# Patient Record
Sex: MALE | Race: ASIAN | NOT HISPANIC OR LATINO
[De-identification: names, ages, dates, MRNs, and addresses within clinical notes are randomized per-mention and may not be internally consistent; named-entity substitution may affect disease eponyms.]

---

## 2021-11-21 PROBLEM — Z00.00 ENCOUNTER FOR PREVENTIVE HEALTH EXAMINATION: Status: ACTIVE | Noted: 2021-11-21

## 2021-11-22 ENCOUNTER — APPOINTMENT (OUTPATIENT)
Dept: ORTHOPEDIC SURGERY | Facility: CLINIC | Age: 21
End: 2021-11-22
Payer: MEDICAID

## 2021-11-22 DIAGNOSIS — M25.571 PAIN IN RIGHT ANKLE AND JOINTS OF RIGHT FOOT: ICD-10-CM

## 2021-11-22 DIAGNOSIS — S99.911A UNSPECIFIED INJURY OF RIGHT ANKLE, INITIAL ENCOUNTER: ICD-10-CM

## 2021-11-22 PROCEDURE — 99204 OFFICE O/P NEW MOD 45 MIN: CPT

## 2021-11-22 NOTE — DISCUSSION/SUMMARY
[de-identified] : Today I had a lengthy discussion with the patient and his Aides regarding their right ankle injury. I have addressed all the patient's concerns surrounding the pathology of their condition. XR imaging was completed in office today and results were reviewed with the patient. I recommend the patient undergo a course of physical therapy for the right ankle  2-3 times a week for a total of 6-8 weeks. A prescription was given for the physical therapy today. I recommended that the patient begin to transition out of the CAM boot to an supportive sneaker. The patient may continue participating in all physical activities without restrictions. He may FWB. The patient understood and verbally agreed to the treatment plan. All of their questions were answered and they were satisfied with the visit. The patient and his Aides should call the office if they have any questions or experience worsening symptoms. I would like to see the patient back in the office in PRN to reassess their condition. 				\par

## 2021-11-22 NOTE — HISTORY OF PRESENT ILLNESS
[FreeTextEntry1] : 11/22/2021: The patient is a 21 year old male presenting with 2 Aides from [facility] for an initial evaluation of right ankle injury. The patient's aide states that the patient twisted his ankle while coming out a van on 11/18/2021. He was evaluated for this issue by an outside Hospital ED, where he was not diagnosed with any acute fractures. The patient was given a CAM boot. The patient's aides do report that the patient has been weightbearing without the CAM since the initial injury. Today, the patient has minimal to no pain in office. The patient presents ambulating with a wheelchair. SYMONE denies any numbness or tingling sensations to the ankle. He has no other complaints.

## 2021-11-22 NOTE — ADDENDUM
[FreeTextEntry1] : I, Beronica Denny, acted solely as a scribe for Carmine Lang PA-C on this date 11/22/2021.\par \par All medical record entries made by the Scribe were at my, Carmine Lang PA-C, direction and personally dictated by me on 11/22/2021 . I have reviewed the chart and agree that the record accurately reflects my personal performance of the history, physical exam, assessment and plan. I have also personally directed, reviewed, and agreed with the chart.	\par

## 2022-09-13 ENCOUNTER — OFFICE (OUTPATIENT)
Dept: URBAN - METROPOLITAN AREA CLINIC 12 | Facility: CLINIC | Age: 22
Setting detail: OPHTHALMOLOGY
End: 2022-09-13
Payer: MEDICAID

## 2022-09-13 DIAGNOSIS — H16.223: ICD-10-CM

## 2022-09-13 PROCEDURE — 92002 INTRM OPH EXAM NEW PATIENT: CPT | Performed by: OPTOMETRIST

## 2022-09-13 ASSESSMENT — KERATOMETRY
OD_K2POWER_DIOPTERS: 44.25
OS_AXISANGLE_DEGREES: 078
OD_AXISANGLE_DEGREES: 097
OS_K1POWER_DIOPTERS: 42.50
OS_K2POWER_DIOPTERS: 44.00
OD_K1POWER_DIOPTERS: 43.00

## 2022-09-13 ASSESSMENT — CONFRONTATIONAL VISUAL FIELD TEST (CVF)
OS_FINDINGS: FULL
OD_FINDINGS: FULL

## 2022-09-13 ASSESSMENT — AXIALLENGTH_DERIVED
OD_AL: 23.5461
OS_AL: 23.7333

## 2022-09-13 ASSESSMENT — REFRACTION_AUTOREFRACTION
OD_AXIS: 012
OS_SPHERE: +0.50
OS_AXIS: 170
OD_SPHERE: +0.50
OS_CYLINDER: -1.25
OD_CYLINDER: -1.00

## 2022-09-13 ASSESSMENT — SPHEQUIV_DERIVED
OS_SPHEQUIV: -0.125
OD_SPHEQUIV: 0

## 2022-09-13 ASSESSMENT — DRY EYES - PHYSICIAN NOTES
OD_GENERALCOMMENTS: POOR TEAR FILM
OS_GENERALCOMMENTS: POOR TEAR FILM

## 2022-09-13 ASSESSMENT — VISUAL ACUITY
OD_BCVA: F&F
OS_BCVA: F&F

## 2023-04-25 ENCOUNTER — OFFICE (OUTPATIENT)
Dept: URBAN - METROPOLITAN AREA CLINIC 12 | Facility: CLINIC | Age: 23
Setting detail: OPHTHALMOLOGY
End: 2023-04-25
Payer: MEDICAID

## 2023-04-25 DIAGNOSIS — H16.223: ICD-10-CM

## 2023-04-25 PROCEDURE — 92012 INTRM OPH EXAM EST PATIENT: CPT | Performed by: OPTOMETRIST

## 2023-04-25 ASSESSMENT — DRY EYES - PHYSICIAN NOTES
OD_GENERALCOMMENTS: POOR TEAR FILM
OS_GENERALCOMMENTS: POOR TEAR FILM

## 2023-04-25 ASSESSMENT — REFRACTION_AUTOREFRACTION
OS_CYLINDER: -1.25
OD_AXIS: 017
OS_AXIS: 166
OS_SPHERE: +0.25
OD_SPHERE: PLANO
OD_CYLINDER: -0.50

## 2023-04-25 ASSESSMENT — KERATOMETRY
OS_K2POWER_DIOPTERS: 44.25
OD_AXISANGLE_DEGREES: 093
OS_K1POWER_DIOPTERS: 42.75
OS_AXISANGLE_DEGREES: 077
OD_K1POWER_DIOPTERS: 43.00
OD_K2POWER_DIOPTERS: 44.50

## 2023-04-25 ASSESSMENT — AXIALLENGTH_DERIVED: OS_AL: 23.7389

## 2023-04-25 ASSESSMENT — SPHEQUIV_DERIVED: OS_SPHEQUIV: -0.375

## 2023-04-25 ASSESSMENT — VISUAL ACUITY
OS_BCVA: 20/25
OD_BCVA: F&F

## 2023-04-25 ASSESSMENT — CONFRONTATIONAL VISUAL FIELD TEST (CVF)
OD_FINDINGS: FULL
OS_FINDINGS: FULL

## 2023-05-16 ENCOUNTER — OUTPATIENT (OUTPATIENT)
Dept: OUTPATIENT SERVICES | Facility: HOSPITAL | Age: 23
LOS: 1 days | End: 2023-05-16

## 2023-05-16 VITALS
RESPIRATION RATE: 16 BRPM | DIASTOLIC BLOOD PRESSURE: 76 MMHG | HEIGHT: 66 IN | TEMPERATURE: 98 F | HEART RATE: 85 BPM | WEIGHT: 195.99 LBS | OXYGEN SATURATION: 99 % | SYSTOLIC BLOOD PRESSURE: 121 MMHG

## 2023-05-16 DIAGNOSIS — K02.62 DENTAL CARIES ON SMOOTH SURFACE PENETRATING INTO DENTIN: ICD-10-CM

## 2023-05-16 DIAGNOSIS — K02.9 DENTAL CARIES, UNSPECIFIED: ICD-10-CM

## 2023-05-16 DIAGNOSIS — K05.6 PERIODONTAL DISEASE, UNSPECIFIED: ICD-10-CM

## 2023-05-16 LAB
ANION GAP SERPL CALC-SCNC: 18 MMOL/L — HIGH (ref 7–14)
BUN SERPL-MCNC: 12 MG/DL — SIGNIFICANT CHANGE UP (ref 7–23)
CALCIUM SERPL-MCNC: 9.2 MG/DL — SIGNIFICANT CHANGE UP (ref 8.4–10.5)
CHLORIDE SERPL-SCNC: 100 MMOL/L — SIGNIFICANT CHANGE UP (ref 98–107)
CO2 SERPL-SCNC: 19 MMOL/L — LOW (ref 22–31)
CREAT SERPL-MCNC: 0.62 MG/DL — SIGNIFICANT CHANGE UP (ref 0.5–1.3)
EGFR: 136 ML/MIN/1.73M2 — SIGNIFICANT CHANGE UP
GLUCOSE SERPL-MCNC: 77 MG/DL — SIGNIFICANT CHANGE UP (ref 70–99)
HCT VFR BLD CALC: 41.4 % — SIGNIFICANT CHANGE UP (ref 39–50)
HGB BLD-MCNC: 13.2 G/DL — SIGNIFICANT CHANGE UP (ref 13–17)
MCHC RBC-ENTMCNC: 26.5 PG — LOW (ref 27–34)
MCHC RBC-ENTMCNC: 31.9 GM/DL — LOW (ref 32–36)
MCV RBC AUTO: 83.1 FL — SIGNIFICANT CHANGE UP (ref 80–100)
NRBC # BLD: 0 /100 WBCS — SIGNIFICANT CHANGE UP (ref 0–0)
NRBC # FLD: 0 K/UL — SIGNIFICANT CHANGE UP (ref 0–0)
PLATELET # BLD AUTO: 270 K/UL — SIGNIFICANT CHANGE UP (ref 150–400)
POTASSIUM SERPL-MCNC: 4.4 MMOL/L — SIGNIFICANT CHANGE UP (ref 3.5–5.3)
POTASSIUM SERPL-SCNC: 4.4 MMOL/L — SIGNIFICANT CHANGE UP (ref 3.5–5.3)
RBC # BLD: 4.98 M/UL — SIGNIFICANT CHANGE UP (ref 4.2–5.8)
RBC # FLD: 13 % — SIGNIFICANT CHANGE UP (ref 10.3–14.5)
SODIUM SERPL-SCNC: 137 MMOL/L — SIGNIFICANT CHANGE UP (ref 135–145)
WBC # BLD: 7.59 K/UL — SIGNIFICANT CHANGE UP (ref 3.8–10.5)
WBC # FLD AUTO: 7.59 K/UL — SIGNIFICANT CHANGE UP (ref 3.8–10.5)

## 2023-05-16 RX ORDER — SODIUM CHLORIDE 9 MG/ML
3 INJECTION INTRAMUSCULAR; INTRAVENOUS; SUBCUTANEOUS EVERY 8 HOURS
Refills: 0 | Status: DISCONTINUED | OUTPATIENT
Start: 2023-05-22 | End: 2023-06-05

## 2023-05-16 RX ORDER — SODIUM CHLORIDE 9 MG/ML
1000 INJECTION, SOLUTION INTRAVENOUS
Refills: 0 | Status: DISCONTINUED | OUTPATIENT
Start: 2023-05-22 | End: 2023-06-05

## 2023-05-16 RX ORDER — DIAZEPAM 5 MG
1 TABLET ORAL
Refills: 0 | DISCHARGE

## 2023-05-16 NOTE — H&P PST ADULT - PROBLEM SELECTOR PLAN 1
Patient scheduled for surgery on: 5/22/23  Facility provided with verbal and written presurgical instructions  List of family member providing consent via phone listed on chart    Lab specimen drawn at PST today: cbc, bmp     Medical  evaluation requested by PST: mateo historian, will obtain

## 2023-05-16 NOTE — H&P PST ADULT - RESPIRATORY AND THORAX COMMENTS
limited information from patient aide chart does not reflect history of airway disease, facility staff did not provide active symptoms

## 2023-05-16 NOTE — H&P PST ADULT - CARDIOVASCULAR COMMENTS
limited information from patient aide chart does not reflect history of cardiac disease, facility staff did not provide active symptoms

## 2023-05-16 NOTE — H&P PST ADULT - NSANTHOSAYNRD_GEN_A_CORE
hand grasp, leg strength strong and equal bilaterally
unable to assess/No. ELIZABETH screening performed.  STOP BANG Legend: 0-2 = LOW Risk; 3-4 = INTERMEDIATE Risk; 5-8 = HIGH Risk

## 2023-05-16 NOTE — H&P PST ADULT - HEMATOLOGY/LYMPHATICS COMMENTS
no record chart does not reflect history of hematology disease, facility staff did not provide active symptoms

## 2023-05-16 NOTE — H&P PST ADULT - HISTORY OF PRESENT ILLNESS
23 yr old male with development delay, autistic disorder, mood disorder presents for PST evaluation for EUA dental exam  23 yr old male with mental development delay, Asperger's syndrome, unspecified mood disorder- patient is non-verbal presents for PST evaluation for EUA dental exam

## 2023-05-16 NOTE — H&P PST ADULT - NSICDXPASTMEDICALHX_GEN_ALL_CORE_FT
PAST MEDICAL HISTORY:  Anxiety     Autism     History of migraine     Mental developmental delay     Mood disorder     Obese      PAST MEDICAL HISTORY:  Anxiety     Autism     Dental caries     Developmental non-verbal disorder     H/O myopia     History of migraine     Mental developmental delay     Mood disorder     Obese

## 2023-05-19 NOTE — ASU PATIENT PROFILE, ADULT - FALL HARM RISK - UNIVERSAL INTERVENTIONS
Bed in lowest position, wheels locked, appropriate side rails in place/Call bell, personal items and telephone in reach/Instruct patient to call for assistance before getting out of bed or chair/Non-slip footwear when patient is out of bed/[place] to call system/Physically safe environment - no spills, clutter or unnecessary equipment/Purposeful Proactive Rounding/Room/bathroom lighting operational, light cord in reach

## 2023-05-19 NOTE — ASU PATIENT PROFILE, ADULT - FALL HARM RISK - FALL HARM RISK
Last Appt:  12/13/21  Return:  No show 4/12/22  Next Appt:  6/14/22    EPDMP check:  Lisdexamfetamine Dimesylate  40MG / Capsule  Rx# [id number] Stimulant Qty: 30  Days: 30  Refills: 0 Prescribed: 3/21/2022  Dispensed: 3/21/2022  Sold: 3/22/2022       Refill due: 4/21/22      MED:   Lisdexamfetamine Dimesylate  30MG / Capsule  Rx# [id number] Stimulant Qty: 30  Days: 30  Refills: 0 Prescribed: 3/17/2022  Dispensed: 3/19/2022  Sold: 3/19/2022         Refill due:  4/18/22           No indicators present

## 2023-05-21 ENCOUNTER — TRANSCRIPTION ENCOUNTER (OUTPATIENT)
Age: 23
End: 2023-05-21

## 2023-05-22 ENCOUNTER — OUTPATIENT (OUTPATIENT)
Dept: OUTPATIENT SERVICES | Facility: HOSPITAL | Age: 23
LOS: 1 days | Discharge: HOME | End: 2023-05-22

## 2023-05-22 ENCOUNTER — TRANSCRIPTION ENCOUNTER (OUTPATIENT)
Age: 23
End: 2023-05-22

## 2023-05-22 VITALS
DIASTOLIC BLOOD PRESSURE: 72 MMHG | HEART RATE: 80 BPM | OXYGEN SATURATION: 97 % | SYSTOLIC BLOOD PRESSURE: 119 MMHG | RESPIRATION RATE: 14 BRPM

## 2023-05-22 VITALS
HEIGHT: 66 IN | HEART RATE: 85 BPM | TEMPERATURE: 97 F | DIASTOLIC BLOOD PRESSURE: 68 MMHG | WEIGHT: 195.99 LBS | SYSTOLIC BLOOD PRESSURE: 126 MMHG | RESPIRATION RATE: 16 BRPM | OXYGEN SATURATION: 98 %

## 2023-05-22 DIAGNOSIS — K05.6 PERIODONTAL DISEASE, UNSPECIFIED: ICD-10-CM

## 2023-05-22 RX ORDER — LORATADINE 10 MG/1
1 TABLET ORAL
Refills: 0 | DISCHARGE

## 2023-05-22 RX ORDER — FLUTICASONE PROPIONATE 220 MCG
1 AEROSOL WITH ADAPTER (GRAM) INHALATION
Refills: 0 | DISCHARGE

## 2023-05-22 RX ORDER — RISPERIDONE 4 MG/1
1 TABLET ORAL
Refills: 0 | DISCHARGE

## 2023-05-22 RX ORDER — NYSTATIN CREAM 100000 [USP'U]/G
1 CREAM TOPICAL
Refills: 0 | DISCHARGE

## 2023-05-22 RX ORDER — DIVALPROEX SODIUM 500 MG/1
2 TABLET, DELAYED RELEASE ORAL
Refills: 0 | DISCHARGE

## 2023-05-22 RX ORDER — POLYETHYLENE GLYCOL 3350 17 G/17G
17 POWDER, FOR SOLUTION ORAL
Refills: 0 | DISCHARGE

## 2023-05-22 RX ORDER — SUMATRIPTAN SUCCINATE 4 MG/.5ML
1 INJECTION, SOLUTION SUBCUTANEOUS
Refills: 0 | DISCHARGE

## 2023-05-22 RX ORDER — DIAZEPAM 5 MG
0 TABLET ORAL
Refills: 0 | DISCHARGE

## 2023-05-22 RX ORDER — CLINDAMYCIN PHOSPHATE GEL USP, 1% 10 MG/G
1 GEL TOPICAL
Refills: 0 | DISCHARGE

## 2023-05-22 RX ORDER — SERTRALINE 25 MG/1
1 TABLET, FILM COATED ORAL
Refills: 0 | DISCHARGE

## 2023-05-22 NOTE — ASU DISCHARGE PLAN (ADULT/PEDIATRIC) - ASU DC SPECIAL INSTRUCTIONSFT
comprehensive dental treatment under general anesthesia to include one wisdom tooth extraction to the upper right side and upper left side, no straw for two days and keep head elevated for the next two days and nights, ice to the upper right twenty minutes on and off today and tomorrow    Tylenol prn pain and tylenol for the next two days for comfort    resume all medications     return to all routine activities  tomorrow    antibiotics will be sent to [facility] and [facility] [place] [facility] [place] 12, appt is set

## 2023-05-22 NOTE — ASU DISCHARGE PLAN (ADULT/PEDIATRIC) - NS MD DC FALL RISK RISK
For information on Fall & Injury Prevention, visit: [email or web address] OR  [email or web address] OR  https://www.cdc.gov/steadi/patient.html

## 2023-10-24 ENCOUNTER — OFFICE (OUTPATIENT)
Dept: URBAN - METROPOLITAN AREA CLINIC 12 | Facility: CLINIC | Age: 23
Setting detail: OPHTHALMOLOGY
End: 2023-10-24
Payer: MEDICAID

## 2023-10-24 DIAGNOSIS — H16.223: ICD-10-CM

## 2023-10-24 PROCEDURE — 99213 OFFICE O/P EST LOW 20 MIN: CPT | Performed by: OPTOMETRIST

## 2023-10-24 ASSESSMENT — CONFRONTATIONAL VISUAL FIELD TEST (CVF)
OS_FINDINGS: FULL
OD_FINDINGS: FULL

## 2023-10-24 ASSESSMENT — VISUAL ACUITY
OD_BCVA: F&F
OS_BCVA: F&F

## 2023-10-24 ASSESSMENT — DRY EYES - PHYSICIAN NOTES
OS_GENERALCOMMENTS: POOR TEAR FILM
OD_GENERALCOMMENTS: POOR TEAR FILM

## 2024-04-23 NOTE — ASU PREOP CHECKLIST - LATEX ALLERGY
Post-Care Instructions: I reviewed with the patient in detail post-care instructions. Patient is to wear sunprotection, and avoid picking at any of the treated lesions. Pt may apply Vaseline to crusted or scabbing areas. Number Of Freeze-Thaw Cycles: 2 freeze-thaw cycles Consent: The patient's consent was obtained including but not limited to risks of crusting, scabbing, blistering, scarring, darker or lighter pigmentary change, recurrence, incomplete removal and infection. Duration Of Freeze Thaw-Cycle (Seconds): 5 Total Number Of Aks Treated: 3 Detail Level: Zone Render In Bullet Format When Appropriate: No no

## 2024-10-29 ENCOUNTER — OFFICE (OUTPATIENT)
Dept: URBAN - METROPOLITAN AREA CLINIC 12 | Facility: CLINIC | Age: 24
Setting detail: OPHTHALMOLOGY
End: 2024-10-29
Payer: MEDICAID

## 2024-10-29 DIAGNOSIS — H16.223: ICD-10-CM

## 2024-10-29 PROCEDURE — 92014 COMPRE OPH EXAM EST PT 1/>: CPT | Performed by: OPTOMETRIST

## 2024-10-29 ASSESSMENT — VISUAL ACUITY
OD_BCVA: F&F
OS_BCVA: F&F

## 2024-10-29 ASSESSMENT — CONFRONTATIONAL VISUAL FIELD TEST (CVF)
OD_FINDINGS: FULL
OS_FINDINGS: FULL

## 2024-10-29 ASSESSMENT — DRY EYES - PHYSICIAN NOTES
OS_GENERALCOMMENTS: POOR TEAR FILM
OD_GENERALCOMMENTS: POOR TEAR FILM

## (undated) DEVICE — PACKING GAUZE PLAIN 1"

## (undated) DEVICE — VENODYNE/SCD SLEEVE CALF MEDIUM

## (undated) DEVICE — DRAPE MAGNETIC INSTRUMENT MEDIUM

## (undated) DEVICE — DRAPE CAMERA ENDOMATE

## (undated) DEVICE — DRSG CURITY GAUZE SPONGE 4 X 4" 12-PLY

## (undated) DEVICE — DRAPE 3/4 SHEET 52X76"

## (undated) DEVICE — VAGINAL PACKING 2"

## (undated) DEVICE — DRAPE LIGHT HANDLE COVER (GREEN)

## (undated) DEVICE — BASIN SET DOUBLE

## (undated) DEVICE — TUBING SUCTION NONCONDUCTIVE 6MM X 12FT

## (undated) DEVICE — ELCTR GROUNDING PAD ADULT COVIDIEN

## (undated) DEVICE — POOLE SUCTION TIP

## (undated) DEVICE — LABELS BLANK W PEN

## (undated) DEVICE — DRAPE SURGICAL #1010

## (undated) DEVICE — DRAPE SPLIT SHEET 77" X 120"

## (undated) DEVICE — SUCTION YANKAUER OPEN TIP NO VENT CURVE

## (undated) DEVICE — DRAPE INSTRUMENT POUCH 6.75" X 11"

## (undated) DEVICE — GOWN XL

## (undated) DEVICE — SOL IRR POUR NS 0.9% 500ML